# Patient Record
Sex: FEMALE | Race: WHITE | NOT HISPANIC OR LATINO | URBAN - METROPOLITAN AREA
[De-identification: names, ages, dates, MRNs, and addresses within clinical notes are randomized per-mention and may not be internally consistent; named-entity substitution may affect disease eponyms.]

---

## 2017-05-22 ENCOUNTER — OFFICE VISIT - RIVER FALLS (OUTPATIENT)
Dept: FAMILY MEDICINE | Facility: CLINIC | Age: 82
End: 2017-05-22

## 2017-06-05 ENCOUNTER — OFFICE VISIT - RIVER FALLS (OUTPATIENT)
Dept: FAMILY MEDICINE | Facility: CLINIC | Age: 82
End: 2017-06-05

## 2017-06-05 ENCOUNTER — COMMUNICATION - RIVER FALLS (OUTPATIENT)
Dept: FAMILY MEDICINE | Facility: CLINIC | Age: 82
End: 2017-06-05

## 2017-06-05 ASSESSMENT — MIFFLIN-ST. JEOR: SCORE: 889.16

## 2017-06-06 LAB
CHOLEST SERPL-MCNC: 192 MG/DL (ref 125–200)
CHOLEST/HDLC SERPL: 2.1 {RATIO}
HDLC SERPL-MCNC: 90 MG/DL
LDLC SERPL CALC-MCNC: 90 MG/DL
NONHDLC SERPL-MCNC: 102 MG/DL
TRIGL SERPL-MCNC: 58 MG/DL

## 2019-06-28 ENCOUNTER — OFFICE VISIT - RIVER FALLS (OUTPATIENT)
Dept: FAMILY MEDICINE | Facility: CLINIC | Age: 84
End: 2019-06-28

## 2022-02-11 VITALS
DIASTOLIC BLOOD PRESSURE: 74 MMHG | HEIGHT: 63 IN | HEART RATE: 76 BPM | WEIGHT: 105.6 LBS | SYSTOLIC BLOOD PRESSURE: 124 MMHG | BODY MASS INDEX: 18.71 KG/M2 | TEMPERATURE: 97.5 F

## 2022-02-11 VITALS — TEMPERATURE: 97.7 F

## 2022-02-16 NOTE — PROGRESS NOTES
Patient:   DANK HAND            MRN: [id number]            FIN: [id number]               Age:   83 years     Sex:  Female     :  1935   Associated Diagnoses:   None   Author:   Hector Rose MD      Visit Information      Date of Service: 2019 11:12 am  Performing Location: [facility]  Encounter#: [id number]      Primary Care Provider (PCP):  97 -UNKNOWN,      Referring Provider:  Terence Hardy MD# [id number]      Chief Complaint   2019 11:21 AM CDT   JVT Consult- headaches/balance concerns per TOR        History of Present Illness   Asked to see by Dr. Diaz for headaches and balance concerns. Patient reports that she is ok at the moment. She gets headaches that she blames on sinuses and allergies. She has not had allergy evaluation[place] in the [place]. She had a MRI scan which showed something in the mastoid. The headaches are typically in the right temple.       Review of Systems   Constitutional:  Negative.    Ear/Nose/Mouth/Throat:  Negative except as documented in history of present illness.    Respiratory:  Negative.       Health Status   Allergies:    Allergic Reactions (Selected)  No known allergies   Medications:  (Selected)   Prescriptions  Prescribed  lovastatin 10 mg oral tablet: 1 tab(s) ( 10 mg ), po, daily, # 90 tab(s), 3 Refill(s), Type: Maintenance, Pharmacy: [id number], 1 tab(s) po daily  Documented Medications  Documented  Calcium: Calcium, daily, Instructions: 1200mg daily., 0 Refill(s), Type: Maintenance  Fish Oil oral capsule: 1 tab, po, daily, 0 Refill(s), Type: Maintenance  Multiple Vitamins oral capsule: 1 cap(s), po, daily, 0 Refill(s), Type: Maintenance  Vitamin C: 1 tab, po, daily, 0 Refill(s), Type: Maintenance   Problem list:    All Problems (Selected)  Aortic insufficiency / SNOMED CT 429415665 / Confirmed  At risk for osteoporosis/osteopenia / SNOMED CT  711537346 / Confirmed  Glaucoma / SNOMED CT 99678428 / Confirmed  Elevated cholesterol / SNOMED CT 02892800 / Confirmed  Multiple thyroid nodules / SNOMED CT 144163669 / Confirmed      Histories   Past Medical History:    Active  Aortic insufficiency (781941877)  Comments:  2011 CST 12:02 PM CST - Kristen Marquez  Trivial aortic insufficiency.  Multiple thyroid nodules (185318821)  Comments:  2011 CST 12:03 PM AMENA - Kristen Marquez  Requiring follow-up.  At risk for osteoporosis/osteopenia (900216890)  Glaucoma (99846037)  Elevated cholesterol (65164552)  Resolved  Chicken pox (683752315):  Resolved.   Family History:    Cancer  Father ()  ALS - Amyotrophic lateral sclerosis  Mother ()  Brother ()  Diabetes mellitus  Brother  Stroke  Brother     Procedure history:    Appendectomy (624128677).  D&C - Dilatation and curettage (5734292660).  Colonscopy  - repeat .  Thyroid US done 2011/13/15 and due .  DEXA due 2019.  Carotid US normal .      Physical Examination   Vital Signs   2019 11:21 AM CDT   Temperature Tympanic      97.7 DegF  LOW       CONSTITUTIONAL  General Appearance:  Normal, well developed, well nourished, no obvious distress  Ability to Communicate:  communicates appropriately.    HEAD AND FACE  Appearance and Symmetry:  Normal, no scalp or facial scarring or suspicious lesions.  Paranasal sinuses tenderness:  Normal, Paranasal sinuses non tender    EARS  Clinical speech reception threshold:  Normal, able to hear normal speech.  Auricle:  Normal, Auricles without scars, lesions, masses.  External auditory canal:  Normal, External auditory canal normal.  Tympanic membrane:  Normal, Tympanic membranes normal without swelling or erythema.  Tympanic membrane mobility:  Normal, Normal tympanic membrane mobility.    NOSE (speculum or scope)  Architecture:  Normal, Grossly normal external nasal architecture with no masses or lesions.  Mucosa:  Normal  mucosa, No polyps or masses.  Septum:  Normal, Septum non-obstructing.  Turbinates:  Normal, No turbinate abnormalities    ORAL CAVITY AND OROPHARYNX  Lips:  Normal.  Dental and gingiva:  Normal, No obvious dental or gingival disease.  Mucosa:  Normal, Moist mucous membranes.  Tongue:  Normal, Tongue mobile with no mucosal abnormalities  Hard and soft palate:  Normal, Hard and soft palate without cleft or mucosal lesions.  Oral pharynx:  Normal, Posterior pharynx without lesions or remarkable asymmetry.  Saliva:  Normal, Clear saliva.  Masses:  Normal, No palpable masses or pathologically enlarged lymph nodes.    NECK  Masses/lymph nodes:  Normal, No worrisome neck masses or lymph nodes.  Salivary glands:  Normal, Parotid and submandibular glands.  Trachea and larynx position:  Normal, Trachea and larynx midline.  Thyroid:  Normal, No thyroid abnormality.  Tenderness:  Normal, No cervical tenderness.  Suppleness:  Normal, Neck supple    NEUROLOGICAL  Speech pattern:  Normal, Proasaic    RESPIRATORY  Symmetry and Respiratory effort:  Normal, Symmetric chest movement and expansion with no increased intercostal retractions or use of accessory muscles.     MRI HEAD  Shows small amount of fluid in the mastoid.      Impression and Plan   Findings discussed with the patient. Exam is negative as is the MRI scan of the head. I explained that I would not expect the findings on the MRI scan to cause any issues. It is certainly not responsible for her headaches which is predominantly right sided. I explained that her headaches may be related to pain in the temporalis muscle. Her sinuses are clear. She will follow up with Dr. Diaz. I explained that she could also consider seeing Neurology.

## 2022-02-16 NOTE — PROGRESS NOTES
Patient:   DANK HAND            MRN: [id number]            FIN: [id number]               Age:   81 years     Sex:  Female     :  1935   Associated Diagnoses:   Well adult; Dyslipidemia   Author:   Dyan Lira      Visit Information      Date of Service: 2017 08:59 am  Performing Location: [facility]  Encounter#: [id number]      Primary Care Provider (PCP):  [id number] -UNKNOWN,      Referring Provider:  Dyan Lira    NPI# [id number]   Visit type:  Annual exam.       Chief Complaint   2017 9:09 AM CDT     AWV, refill Lovastatin.     Medicare Initial / Annual Preventative Visit      Well Adult History   Well Adult History             The patient presents for well adult exam.  The patient's general health status is described as good.  The patient's diet is described as balanced.  Exercise: walks 2 miles daily, is participating in the ALS walk this next weekend  Strong  ALS.  Associated symptoms consist of none.  Additional pertinent history: tobacco use none.  Activities of daily living and Safety were reviewed. She lives alone and is safe  I have reviewed with the patient the completed health risk assessment and health history form.  Counseled on prevention of safety hazards. All area of concern were addressed.  Please see copy of scanned form.    ..        Review of Systems   Eye:  See Preventative Services Checklist for Vision/Glaucoma Test dates..    Ear/Nose/Mouth/Throat:  Hearing is addressed and any noted impairment is offered consult with audiology.    Psychiatric:  PHQ-9 and CAGE questionaire noted and reviewed with patient.    See completed Health History for Review of Systems.      Health Status   Allergies:    Allergic Reactions (Selected)  No known allergies   Medications:  (Selected)   Prescriptions  Prescribed  lovastatin 10 mg oral tablet: 1 tab(s) ( 10 mg ), po, daily, # 90 tab(s), 3 Refill(s), Type: Maintenance, Pharmacy: [id number], 1  tab(s) po daily  Documented Medications  Documented  Calcium: Calcium, daily, Instructions: 1200mg daily., 0 Refill(s), Type: Maintenance  Fish Oil oral capsule: 1 tab, po, daily, 0 Refill(s), Type: Maintenance  Multiple Vitamins oral capsule: 1 cap(s), po, daily, 0 Refill(s), Type: Maintenance  Vitamin C: 1 tab, po, daily, 0 Refill(s), Type: Maintenance  latanoprost 0.005% ophthalmic solution: 1 drop(s), both eyes, hs, 0 Refill(s), Type: Maintenance   Problem list:    All Problems  Aortic insufficiency / SNOMED CT 514961918 / Confirmed  Trivial aortic insufficiency.  Multiple Thyroid Nodules / ICD-9-.1 / Confirmed  Requiring follow-up.  Elevated Cholesterol / ICD-9-.0 / Confirmed  Glaucoma / ICD-9-.9 / Confirmed  At risk for osteoporosis/osteopenia / ICD-9-CM V49.89 / Confirmed  Resolved: Chicken Pox / ICD-9-.9     Current Providers and Suppliers Noted in Demographic or sticky notes Area.      Histories   Past Medical History:    Active  Aortic insufficiency (013839589)  Comments:  2011 CST 12:02 PM Kristen Cheung  Trivial aortic insufficiency.  Multiple Thyroid Nodules (241.1)  Comments:  2011 CST 12:03 PM Kristen Cheung  Requiring follow-up.  At risk for osteoporosis/osteopenia (V49.89)  Glaucoma (365.9)  Elevated Cholesterol (272.0)  Resolved  Chicken Pox (052.9):  Resolved.   Family History:    Cancer  Father ()  ALS - Amyotrophic lateral sclerosis  Mother ()  Brother ()  Diabetes mellitus  Brother  Stroke  Brother     Procedure history:    Appendectomy (SNOMED CT 219663834).  D&C - Dilatation and curettage (SNOMED CT 8200243400).  Colonscopy  - repeat .  Thyroid US done 2011/13/15 and due .  DEXA due 2019.  Carotid US normal .   Social History:        Alcohol Assessment            One glass of wine 0-1 x per month.      Tobacco Assessment            Past      Substance Abuse Assessment            Never      Employment and  Education Assessment            Retired      Home and Environment Assessment            Risks in environment: Does not wear helmet.      Nutrition and Health Assessment            Type of diet: Regular.      Exercise and Physical Activity Assessment            Exercise frequency: Daily.  Exercise type: Walking.      Sexual Assessment            Sexually active: No.        Physical Examination   EXAM AT PROVIDER DISCRETION   Vital Signs   6/5/2017 9:09 AM CDT Temperature Tympanic 97.5 DegF  LOW    Peripheral Pulse Rate 76 bpm    Pulse Site Radial artery    HR Method Manual    Systolic Blood Pressure 124 mmHg    Diastolic Blood Pressure 74 mmHg    Mean Arterial Pressure 91 mmHg    BP Site Right arm    BP Method Manual      Measurements from flowsheet : Measurements   6/5/2017 9:09 AM CDT Height Measured - Standard 62.75 in    Weight Measured - Standard 105.6 lb    BSA 1.45 m2    Body Mass Index 18.85 kg/m2      General:  Alert and oriented, No acute distress.    Eye:  Pupils are equal, round and reactive to light, Extraocular movements are intact, Normal conjunctiva.    HENT:  Tympanic membranes are clear, Oral mucosa is moist, No pharyngeal erythema.    Neck:  Supple, Non-tender, No lymphadenopathy, No thyromegaly.    Respiratory:  Lungs are clear to auscultation, Respirations are non-labored, Breath sounds are equal, Symmetrical chest wall expansion.    Cardiovascular:  Normal rate, Regular rhythm.    Musculoskeletal:  No deformity.    Integumentary:  Warm, Dry, Pink.    Neurologic:  Alert, Oriented, No signs of cognitive impairment..    Psychiatric:  Cooperative, Appropriate mood & affect, Normal judgment, Non-suicidal.       Review / Management   Results review:  INCLUDE PHQ 9.       Impression and Plan   Diagnosis     Well adult (KWY91-YS Z00.00).     Dyslipidemia (NEC39-ZM E78.5).     Plan:  see below.    Patient Instructions:       Counseled: Preventive Service checklist reviewed, updated and copy given to  patient.  Please see scanned document.      Discussed preventive services including   Lipid Screening, DM Screening, Nutrition, Bone Mass,   Cancer Screening:, Immunizations (flu, Pneumovax, hep B, Zostavax),   Glaucoma Screening, hearing screening,    Screening for AAA Risks(Family history, or male 65-70 who has smoked more than 100 cigarettes in a lifetime),  Appropriate weight and diet discussed.    Discussed Advanced Life Directive    She has intermit bursistis left hip, saw PT for long time, uses ice. Is not interested in seeing ortho for steroid injectionat this time.  She would like to postpone mammo 1 year. Have always been normal  Dexa improving last year, will postpone  Colonoscopy not due till Dec. Will re visit decision next spring to repeat it.  Vaccines are UTD  Declines seeing audiologist again.    Orders     Orders (Selected)   Outpatient Orders  Ordered (Dispatched)  Lipid panel with reflex to direct ldl* ([facility]): Specimen Type: Serum, Collection Date: 06/05/17 9:50:00 CDT.       she has beent aking lipiotr every other day as running low.

## 2022-02-16 NOTE — NURSING NOTE
Comprehensive Intake Entered On:  6/28/2019 11:27 AM CDT    Performed On:  6/28/2019 11:21 AM CDT by Apurva Zuniga CMA               Summary   Chief Complaint :   JVT Consult- headaches/balance concerns per TOR   Temperature Tympanic :   97.7 DegF(Converted to: 36.5 DegC)  (LOW)    Languages :   English   Efrain LO Apurva - 6/28/2019 11:21 AM CDT   Health Status   Allergies Verified? :   Yes   Medication History Verified? :   Yes   Immunizations Current :   Yes   Medical History Verified? :   No   Pre-Visit Planning Status :   Not completed   Tobacco Use? :   Former smoker   Efrain LO Apurva - 6/28/2019 11:21 AM CDT   Meds / Allergies   (As Of: 6/28/2019 11:27:31 AM CDT)   Allergies (Active)   No known allergies  Estimated Onset Date:   Unspecified ; Created By:   Kristen Marquez; Reaction Status:   Active ; Category:   Drug ; Substance:   No known allergies ; Type:   Allergy ; Updated By:   Kristen Marquez; Reviewed Date:   6/28/2019 11:25 AM CDT        Medication List   (As Of: 6/28/2019 11:27:31 AM CDT)   Prescription/Discharge Order    lovastatin  :   lovastatin ; Status:   Prescribed ; Ordered As Mnemonic:   lovastatin 10 mg oral tablet ; Simple Display Line:   10 mg, 1 tab(s), po, daily, 90 tab(s), 3 Refill(s) ; Ordering Provider:   Dyan Lira; Catalog Code:   lovastatin ; Order Dt/Tm:   6/5/2017 9:32:19 AM            Home Meds    ascorbic acid  :   ascorbic acid ; Status:   Documented ; Ordered As Mnemonic:   Vitamin C ; Simple Display Line:   1 tab, po, daily ; Catalog Code:   ascorbic acid ; Order Dt/Tm:   1/21/2011 10:03:57 AM          latanoprost ophthalmic  :   latanoprost ophthalmic ; Status:   Processing ; Ordered As Mnemonic:   latanoprost 0.005% ophthalmic solution ; Action Display:   Complete ; Catalog Code:   latanoprost ophthalmic ; Order Dt/Tm:   6/28/2019 11:27:05 AM          Miscellaneous Prescription  :   Miscellaneous Prescription ; Status:   Documented ; Ordered As Mnemonic:    Calcium ; Simple Display Line:   daily, 1200mg daily. ; Catalog Code:   Miscellaneous Prescription ; Order Dt/Tm:   8/28/2012 9:06:36 AM          multivitamin  :   multivitamin ; Status:   Documented ; Ordered As Mnemonic:   Multiple Vitamins oral capsule ; Simple Display Line:   1 cap(s), po, daily ; Catalog Code:   multivitamin ; Order Dt/Tm:   2/18/2014 10:05:50 AM          omega-3 polyunsaturated fatty acids  :   omega-3 polyunsaturated fatty acids ; Status:   Documented ; Ordered As Mnemonic:   Fish Oil oral capsule ; Simple Display Line:   1 tab, po, daily ; Catalog Code:   omega-3 polyunsaturated fatty acids ; Order Dt/Tm:   1/21/2011 10:05:02 AM

## 2024-01-17 ENCOUNTER — LAB REQUISITION (OUTPATIENT)
Dept: LAB | Facility: CLINIC | Age: 89
End: 2024-01-17
Payer: MEDICARE

## 2024-01-17 LAB
ALBUMIN UR-MCNC: NEGATIVE MG/DL
APPEARANCE UR: CLEAR
BILIRUB UR QL STRIP: NEGATIVE
COLOR UR AUTO: YELLOW
GLUCOSE UR STRIP-MCNC: NEGATIVE MG/DL
HGB UR QL STRIP: NEGATIVE
KETONES UR STRIP-MCNC: NEGATIVE MG/DL
LEUKOCYTE ESTERASE UR QL STRIP: NEGATIVE
MUCOUS THREADS #/AREA URNS LPF: PRESENT /LPF
NITRATE UR QL: NEGATIVE
PH UR STRIP: 6 [PH] (ref 5–7)
RBC URINE: 1 /HPF
SP GR UR STRIP: 1.02 (ref 1–1.03)
TRANSITIONAL EPI: <1 /HPF
UROBILINOGEN UR STRIP-MCNC: NORMAL MG/DL
WBC URINE: 3 /HPF

## 2024-01-17 PROCEDURE — 81001 URINALYSIS AUTO W/SCOPE: CPT | Mod: ORL | Performed by: FAMILY MEDICINE
